# Patient Record
Sex: MALE | URBAN - METROPOLITAN AREA
[De-identification: names, ages, dates, MRNs, and addresses within clinical notes are randomized per-mention and may not be internally consistent; named-entity substitution may affect disease eponyms.]

---

## 2021-06-17 ENCOUNTER — HOSPITAL ENCOUNTER (OUTPATIENT)
Dept: OCCUPATIONAL MEDICINE | Age: 31
Discharge: HOME | End: 2021-06-17

## 2021-06-17 ENCOUNTER — TRANSCRIBE ORDER (OUTPATIENT)
Dept: OCCUPATIONAL MEDICINE | Age: 31
End: 2021-06-17

## 2021-06-17 DIAGNOSIS — T14.90XA INJURY: ICD-10-CM

## 2021-06-17 DIAGNOSIS — T14.90XA INJURY: Primary | ICD-10-CM

## 2024-02-08 ENCOUNTER — HOSPITAL ENCOUNTER (OUTPATIENT)
Dept: SLEEP CENTER | Age: 34
Discharge: HOME | End: 2024-02-11

## 2024-02-27 ENCOUNTER — TELEPHONE (OUTPATIENT)
Dept: SLEEP MEDICINE | Age: 34
End: 2024-02-27

## 2024-03-07 NOTE — PROGRESS NOTES
count calories. Patient can police themselves.     If the future, if still having issues can use a more regimented diet e.g. [place], Atkins, Optavia, etc. Clinical correlation suggested.               PLAN:    Start CPAP 5-15 cm H2O with nightly compliance  [facility]  Recommendations as above  Follow-up in 4 months or sooner if needed         Orders Placed This Encounter   Procedures    DME - DURABLE MEDICAL EQUIPMENT     [facility]  Phone: [address]  [place] [address]  Dept: [phone]      Patient Name: Allen Floyd  : 1990  Gender: male  Address: [address] [place] [address] [place] [address]  Patient phone number: [phone] (home)       Primary Insurance: Payor: [employer] / Plan: [employer] / Product Type: *No Product type* /   Subscriber ID: [id number] - (Other)      AMB Supply Order  Order Details     [facility]   Order Date: 3/8/2024   The primary encounter diagnosis was MARGIE (obstructive sleep apnea). Diagnoses of Nocturnal hypoxemia, Snoring, Witnessed apneic spells, Non-restorative sleep, Hypersomnolence, Morning headache, Persistent disorder of initiating or maintaining sleep, and Obesity (BMI 30.0-34.9) were also pertinent to this visit.          (  X   )New Set-Up     CPAP machine   (     ) CPAP Unit  (  x   ) Auto CPAP Unit  (     ) BiLevel Unit  (     ) Auto BiLevel Unit  (     ) ASV   (     ) Bilevel ST    (     ) Oxygen Concentrator         Length of need: 12 months    Pressure: 5-15  cmH20  EPR: 2     Starting Ramp Pressure:  4 cm H20  Ramp Time: min  15    Patient had a diagnostic Apnea Hypopnea Index (AHI) of :  10.5    *SUPPLIES* Replace all as needed, or per coverage guidelines     Masks Type:    (  x   ) -Full Face Mask (1 per 3 mon)  ( x    ) -Full Mask (1 per month) Interface/Cushion      ( x    ) -Nasal Mask (1 per 3

## 2024-03-08 ENCOUNTER — TELEPHONE (OUTPATIENT)
Dept: SLEEP MEDICINE | Age: 34
End: 2024-03-08

## 2024-03-08 ENCOUNTER — OFFICE VISIT (OUTPATIENT)
Dept: SLEEP MEDICINE | Age: 34
End: 2024-03-08
Payer: OTHER GOVERNMENT

## 2024-03-08 VITALS
HEART RATE: 60 BPM | BODY MASS INDEX: 31.7 KG/M2 | WEIGHT: 214 LBS | TEMPERATURE: 97.3 F | HEIGHT: 69 IN | DIASTOLIC BLOOD PRESSURE: 84 MMHG | SYSTOLIC BLOOD PRESSURE: 126 MMHG | OXYGEN SATURATION: 94 %

## 2024-03-08 DIAGNOSIS — E66.9 OBESITY (BMI 30.0-34.9): ICD-10-CM

## 2024-03-08 DIAGNOSIS — G47.00 PERSISTENT DISORDER OF INITIATING OR MAINTAINING SLEEP: ICD-10-CM

## 2024-03-08 DIAGNOSIS — G47.33 OSA (OBSTRUCTIVE SLEEP APNEA): Primary | ICD-10-CM

## 2024-03-08 DIAGNOSIS — R06.81 WITNESSED APNEIC SPELLS: ICD-10-CM

## 2024-03-08 DIAGNOSIS — R51.9 MORNING HEADACHE: ICD-10-CM

## 2024-03-08 DIAGNOSIS — G47.34 NOCTURNAL HYPOXEMIA: ICD-10-CM

## 2024-03-08 DIAGNOSIS — G47.8 NON-RESTORATIVE SLEEP: ICD-10-CM

## 2024-03-08 DIAGNOSIS — R06.83 SNORING: ICD-10-CM

## 2024-03-08 DIAGNOSIS — G47.10 HYPERSOMNOLENCE: ICD-10-CM

## 2024-03-08 PROBLEM — E66.811 OBESITY (BMI 30.0-34.9): Status: ACTIVE | Noted: 2024-03-08

## 2024-03-08 PROCEDURE — 99203 OFFICE O/P NEW LOW 30 MIN: CPT | Performed by: NURSE PRACTITIONER

## 2024-03-08 RX ORDER — HYDROXYZINE HYDROCHLORIDE 10 MG/1
10 TABLET, FILM COATED ORAL EVERY 6 HOURS PRN
COMMUNITY
Start: 2022-08-01

## 2024-03-08 RX ORDER — RISPERIDONE 1 MG/1
TABLET ORAL
COMMUNITY

## 2024-03-08 RX ORDER — OMEPRAZOLE 40 MG/1
40 CAPSULE, DELAYED RELEASE ORAL 2 TIMES DAILY
COMMUNITY
Start: 2023-06-27 | End: 2024-06-26

## 2024-03-08 RX ORDER — DICYCLOMINE HYDROCHLORIDE 10 MG/1
CAPSULE ORAL
COMMUNITY
Start: 2023-09-22

## 2024-03-08 RX ORDER — INSULIN LISPRO 100 [IU]/ML
INJECTION, SOLUTION INTRAVENOUS; SUBCUTANEOUS
COMMUNITY
Start: 2024-01-19

## 2024-03-08 RX ORDER — TRIAMCINOLONE ACETONIDE 55 UG/1
2 SPRAY, METERED NASAL DAILY
COMMUNITY
Start: 2016-03-01

## 2024-03-08 RX ORDER — DICLOFENAC SODIUM 75 MG/1
75 TABLET, DELAYED RELEASE ORAL 2 TIMES DAILY
COMMUNITY
Start: 2023-04-14

## 2024-03-08 ASSESSMENT — SLEEP AND FATIGUE QUESTIONNAIRES
HOW LIKELY ARE YOU TO NOD OFF OR FALL ASLEEP WHILE LYING DOWN TO REST IN THE AFTERNOON WHEN CIRCUMSTANCES PERMIT: 1
HOW LIKELY ARE YOU TO NOD OFF OR FALL ASLEEP WHILE SITTING AND READING: 2
HOW LIKELY ARE YOU TO NOD OFF OR FALL ASLEEP WHILE SITTING QUIETLY AFTER LUNCH WITHOUT ALCOHOL: 1
HOW LIKELY ARE YOU TO NOD OFF OR FALL ASLEEP WHEN YOU ARE A PASSENGER IN A CAR FOR AN HOUR WITHOUT A BREAK: 0
HOW LIKELY ARE YOU TO NOD OFF OR FALL ASLEEP WHILE SITTING AND TALKING TO SOMEONE: 0
HOW LIKELY ARE YOU TO NOD OFF OR FALL ASLEEP WHILE WATCHING TV: 2
HOW LIKELY ARE YOU TO NOD OFF OR FALL ASLEEP WHILE SITTING INACTIVE IN A PUBLIC PLACE: 1
HOW LIKELY ARE YOU TO NOD OFF OR FALL ASLEEP IN A CAR, WHILE STOPPED FOR A FEW MINUTES IN TRAFFIC: 2
ESS TOTAL SCORE: 9

## 2024-03-08 NOTE — PATIENT INSTRUCTIONS
Start CPAP 5-15 cm H2O with nightly compliance  [facility]AP set [facility]  Recommendations as above  Follow-up in 4 months or sooner if needed

## 2024-08-14 ENCOUNTER — TELEPHONE (OUTPATIENT)
Dept: SLEEP MEDICINE | Age: 34
End: 2024-08-14

## 2024-08-16 ENCOUNTER — TELEPHONE (OUTPATIENT)
Dept: SLEEP MEDICINE | Age: 34
End: 2024-08-16

## 2024-08-16 ENCOUNTER — OFFICE VISIT (OUTPATIENT)
Dept: SLEEP MEDICINE | Age: 34
End: 2024-08-16

## 2024-08-16 VITALS
OXYGEN SATURATION: 97 % | RESPIRATION RATE: 17 BRPM | DIASTOLIC BLOOD PRESSURE: 85 MMHG | WEIGHT: 219 LBS | SYSTOLIC BLOOD PRESSURE: 136 MMHG | BODY MASS INDEX: 32.44 KG/M2 | HEIGHT: 69 IN | HEART RATE: 93 BPM

## 2024-08-16 DIAGNOSIS — G47.34 NOCTURNAL HYPOXEMIA: ICD-10-CM

## 2024-08-16 DIAGNOSIS — R06.83 SNORING: ICD-10-CM

## 2024-08-16 DIAGNOSIS — G47.8 NON-RESTORATIVE SLEEP: ICD-10-CM

## 2024-08-16 DIAGNOSIS — G47.33 OSA (OBSTRUCTIVE SLEEP APNEA): Primary | ICD-10-CM

## 2024-08-16 DIAGNOSIS — E66.9 OBESITY (BMI 30.0-34.9): ICD-10-CM

## 2024-08-16 ASSESSMENT — SLEEP AND FATIGUE QUESTIONNAIRES
HOW LIKELY ARE YOU TO NOD OFF OR FALL ASLEEP WHILE SITTING AND TALKING TO SOMEONE: WOULD NEVER DOZE
HOW LIKELY ARE YOU TO NOD OFF OR FALL ASLEEP IN A CAR, WHILE STOPPED FOR A FEW MINUTES IN TRAFFIC: WOULD NEVER DOZE
HOW LIKELY ARE YOU TO NOD OFF OR FALL ASLEEP WHILE WATCHING TV: SLIGHT CHANCE OF DOZING
ESS TOTAL SCORE: 8
HOW LIKELY ARE YOU TO NOD OFF OR FALL ASLEEP WHILE SITTING INACTIVE IN A PUBLIC PLACE: SLIGHT CHANCE OF DOZING
HOW LIKELY ARE YOU TO NOD OFF OR FALL ASLEEP WHILE LYING DOWN TO REST IN THE AFTERNOON WHEN CIRCUMSTANCES PERMIT: MODERATE CHANCE OF DOZING
HOW LIKELY ARE YOU TO NOD OFF OR FALL ASLEEP WHILE SITTING QUIETLY AFTER LUNCH WITHOUT ALCOHOL: SLIGHT CHANCE OF DOZING
HOW LIKELY ARE YOU TO NOD OFF OR FALL ASLEEP WHILE SITTING AND READING: HIGH CHANCE OF DOZING

## 2024-08-16 NOTE — PATIENT INSTRUCTIONS
Continue CPAP 5-15 cm H2O with nightly compliance  New CPAP supplies ordered with request for ResMed N20 size small [facility]  Recommendations as above  Follow-up in 4 months or sooner if needed

## 2024-08-16 NOTE — PROGRESS NOTES
[facility]  [address]  [place] [address]  [phone]    Patient Name:  Allen Floyd  YOB: 1990      Office Visit 8/16/2024    CHIEF COMPLAINT:    Chief Complaint   Patient presents with    Sleep Apnea         HISTORY OF PRESENT ILLNESS:  Patient is a 33 y.o. male seen today for follow up of MARGIE.  Diagnostic sleep study on 02/08/2024 with an AHI of 10 and lowest oxygen saturation of 79%. He is prescribed cpap therapy with a humidifier set at 5-15 cm with a nasal pillow mask.  He did not bring his CPAP machine to the visit today.  He reports that he was not informed that he needed to bring the machine.  He does report that he has started CPAP and does feel better when he is can successfully wear the CPAP through the night and tolerate the mask.  He states that he is having a problem with the nasal pillows not staying in his nostrils and leaking then he will just take the mask off.  I did look on his [place] sandy on his phone and for the most part his AHI is well-controlled when he is wearing CPAP.  We did discuss mask and I fitted him for a N20 size small and [facility].  He denies any major medical changes since his last visit.  Reports that his weight has consistently been around 220 pounds.  His blood pressure is controlled today.      [place]ingo Sleepiness Scale      8/16/2024     3:27 PM 3/8/2024     8:15 AM   Sleep Medicine   Sitting and reading 3 2   Watching TV 1 2   Sitting, inactive in a public place (e.g. a theatre or a meeting) 1 1   As a passenger in a car for an hour without a break 0 0   Lying down to rest in the afternoon when circumstances permit 2 1   Sitting and talking to someone 0 0   Sitting quietly after a lunch without alcohol 1 1   In a car, while stopped for a few minutes in traffic 0 2   Mingo Sleepiness Score 8 9          History reviewed. No pertinent past medical history.      Patient Active Problem List

## 2025-05-19 ENCOUNTER — HOSPITAL ENCOUNTER (EMERGENCY)
Age: 35
Discharge: HOME | End: 2025-05-19
Attending: STUDENT IN AN ORGANIZED HEALTH CARE EDUCATION/TRAINING PROGRAM
Payer: OTHER GOVERNMENT

## 2025-05-19 VITALS
WEIGHT: 212 LBS | HEART RATE: 67 BPM | RESPIRATION RATE: 19 BRPM | BODY MASS INDEX: 31.4 KG/M2 | DIASTOLIC BLOOD PRESSURE: 104 MMHG | SYSTOLIC BLOOD PRESSURE: 150 MMHG | HEIGHT: 69 IN | OXYGEN SATURATION: 98 % | TEMPERATURE: 98 F

## 2025-05-19 DIAGNOSIS — S09.90XA CLOSED HEAD INJURY, INITIAL ENCOUNTER: Primary | ICD-10-CM

## 2025-05-19 PROCEDURE — 99282 EMERGENCY DEPT VISIT SF MDM: CPT

## 2025-05-19 ASSESSMENT — PAIN - FUNCTIONAL ASSESSMENT: PAIN_FUNCTIONAL_ASSESSMENT: 0-10

## 2025-05-19 ASSESSMENT — PAIN SCALES - GENERAL: PAINLEVEL_OUTOF10: 6

## 2025-05-19 NOTE — DISCHARGE INSTRUCTIONS
Alternate Tylenol and Motrin as needed for discomfort.  Follow-up with primary care physician 1 to 2 weeks as needed.  Return to the ER for any worsening or worrisome symptoms.

## 2025-05-19 NOTE — ED TRIAGE NOTES
Patient is diabetic and had a low sugar and forgot to replace sugar snacks so he got up and stumbled on lower two steps hitting back of head. No LOC, nausea, or dizziness. Patient endorsees left side headache. Patient took some BC powder with relief.

## 2025-05-19 NOTE — ED PROVIDER NOTES
Emergency Department Provider Note       [facility]Y DEPT   PCP: No primary care provider on file.   Age: 34 y.o.   Sex: male     DISPOSITION Decision To Discharge 05/19/2025 11:22:16 AM    ICD-10-CM    1. Closed head injury, initial encounter  S09.90XA           Medical Decision Making     34-year-old male presents emergency department with wife.  Patient complains of posterior headache after he became lightheaded causing him to fall last night.  States he just walked down the steps when he lost his balance on the last step fell forward and then fell backwards hitting the back of his head against the bottom step.  This occurred approximately 12 hours prior to arrival.  He does report the step is covered in carpet.  Denies loss of consciousness.  Denies vomiting.  Denies coordination abnormality.  Reports chronic neck pain with no change in his symptoms.  Has been ambulating normally today.  Took Goody's powder earlier today with improvement of his headache but not resolution.  Through shared decision making, patient with a normal neurologic exam, no depressible fracture noted, no vomiting, did not feel a CT scan is indicated this time.  Did offer obtaining CT of the head but again do not feel it is indicated.  Patient and family agree that no CT scan is needed at this time.  Will encourage over-the-counter Tylenol and Motrin as needed for discomfort.  Outpatient follow-up and return precautions given.  Patient and family voiced understanding and agreement.     Complexity of Problem: Acute complaint requiring workup rule out emergent etiology  Over the counter drug management performed.  The following clinical decision tools were used in the care of this patient [place] Head CT rule.  Shared medical decision making was utilized in creating the patients health plan today.  I independently ordered and reviewed each unique test.    I reviewed external records: provider visit note from PCP.   The patients